# Patient Record
Sex: FEMALE | Race: WHITE | NOT HISPANIC OR LATINO | Employment: OTHER | URBAN - METROPOLITAN AREA
[De-identification: names, ages, dates, MRNs, and addresses within clinical notes are randomized per-mention and may not be internally consistent; named-entity substitution may affect disease eponyms.]

---

## 2018-01-08 NOTE — PATIENT DISCUSSION
Continue hot compress, AT prn, emycin lauren Qhs OU. Add Bactrim /160 mg   #14 Pills one pill twice a day.

## 2018-01-22 NOTE — PATIENT DISCUSSION
partially opened Tarsorrhaphy today without comps in the office, placed E mycin ointment in eye and patched eye. Patch can come off tomorrow. Continue all drops as instructed.

## 2018-02-14 NOTE — PATIENT DISCUSSION
Not much improvement. Continue Maxitrol QID OD. Stop erythromycin lauren and change to bacitracin lauren Qhs OU. Hot compresses QD.

## 2018-02-28 NOTE — PATIENT DISCUSSION
Starting to improve. Continue Maxitrol BID OD. Continue bacitracin lauren Qhs OU. Hot compresses QD.

## 2018-03-19 NOTE — PATIENT DISCUSSION
May consider small reduction in tarsorrhaphy with Pilar Sine if unable to get drops into OD, but do not recommend a full opening due to the risk of corneal decompensation.

## 2018-04-02 NOTE — PATIENT DISCUSSION
May consider small reduction in tarsorrhaphy with Careyyth Prude if unable to get drops into OD, but do not recommend a full opening due to the risk of corneal decompensation.

## 2018-04-30 ENCOUNTER — ESTABLISHED COMPREHENSIVE EXAM (OUTPATIENT)
Dept: URBAN - METROPOLITAN AREA CLINIC 36 | Facility: CLINIC | Age: 63
End: 2018-04-30

## 2018-04-30 DIAGNOSIS — H25.813: ICD-10-CM

## 2018-04-30 DIAGNOSIS — D31.31: ICD-10-CM

## 2018-04-30 DIAGNOSIS — H35.372: ICD-10-CM

## 2018-04-30 DIAGNOSIS — H04.123: ICD-10-CM

## 2018-04-30 PROCEDURE — 92015 DETERMINE REFRACTIVE STATE: CPT

## 2018-04-30 PROCEDURE — 92014 COMPRE OPH EXAM EST PT 1/>: CPT

## 2018-04-30 ASSESSMENT — VISUAL ACUITY
OS_SC: J6
OD_SC: 20/25-2
OD_SC: J3
OS_SC: 20/25-2

## 2018-04-30 ASSESSMENT — TONOMETRY
OD_IOP_MMHG: 11
OS_IOP_MMHG: 12

## 2019-06-05 ENCOUNTER — ESTABLISHED COMPREHENSIVE EXAM (OUTPATIENT)
Dept: URBAN - METROPOLITAN AREA CLINIC 36 | Facility: CLINIC | Age: 64
End: 2019-06-05

## 2019-06-05 DIAGNOSIS — H25.811: ICD-10-CM

## 2019-06-05 DIAGNOSIS — H25.812: ICD-10-CM

## 2019-06-05 DIAGNOSIS — H52.7: ICD-10-CM

## 2019-06-05 PROCEDURE — 92014 COMPRE OPH EXAM EST PT 1/>: CPT

## 2019-06-05 PROCEDURE — 92015 DETERMINE REFRACTIVE STATE: CPT

## 2019-06-05 ASSESSMENT — VISUAL ACUITY
OS_SC: J8
OD_SC: J6
OS_SC: 20/25-2
OD_SC: 20/25-2

## 2019-06-05 ASSESSMENT — TONOMETRY
OD_IOP_MMHG: 13
OS_IOP_MMHG: 14

## 2020-06-17 ENCOUNTER — ESTABLISHED COMPREHENSIVE EXAM (OUTPATIENT)
Dept: URBAN - METROPOLITAN AREA CLINIC 36 | Facility: CLINIC | Age: 65
End: 2020-06-17

## 2020-06-17 DIAGNOSIS — H25.811: ICD-10-CM

## 2020-06-17 DIAGNOSIS — H25.812: ICD-10-CM

## 2020-06-17 DIAGNOSIS — H52.7: ICD-10-CM

## 2020-06-17 PROCEDURE — 92015 DETERMINE REFRACTIVE STATE: CPT

## 2020-06-17 PROCEDURE — 92014 COMPRE OPH EXAM EST PT 1/>: CPT

## 2020-06-17 ASSESSMENT — VISUAL ACUITY
OD_SC: J3
OD_CC: J1+
OS_SC: J5
OD_SC: 20/25-2
OS_CC: J1+
OS_SC: 20/25-2

## 2020-06-17 ASSESSMENT — TONOMETRY
OS_IOP_MMHG: 14
OD_IOP_MMHG: 14

## 2021-07-06 ENCOUNTER — ESTABLISHED COMPREHENSIVE EXAM (OUTPATIENT)
Dept: URBAN - METROPOLITAN AREA CLINIC 36 | Facility: CLINIC | Age: 66
End: 2021-07-06

## 2021-07-06 DIAGNOSIS — H10.45: ICD-10-CM

## 2021-07-06 DIAGNOSIS — H35.372: ICD-10-CM

## 2021-07-06 DIAGNOSIS — D31.31: ICD-10-CM

## 2021-07-06 DIAGNOSIS — H52.7: ICD-10-CM

## 2021-07-06 DIAGNOSIS — H04.123: ICD-10-CM

## 2021-07-06 DIAGNOSIS — H25.811: ICD-10-CM

## 2021-07-06 DIAGNOSIS — H25.812: ICD-10-CM

## 2021-07-06 PROCEDURE — 92014 COMPRE OPH EXAM EST PT 1/>: CPT

## 2021-07-06 PROCEDURE — 92015 DETERMINE REFRACTIVE STATE: CPT

## 2021-07-06 ASSESSMENT — VISUAL ACUITY
OD_CC: J2
OS_CC: J1
OS_SC: 20/25
OD_SC: J3 @ 8"
OD_SC: 20/25-1

## 2021-07-06 ASSESSMENT — TONOMETRY
OD_IOP_MMHG: 16
OS_IOP_MMHG: 16

## 2022-08-30 ENCOUNTER — COMPREHENSIVE EXAM (OUTPATIENT)
Dept: URBAN - METROPOLITAN AREA CLINIC 36 | Facility: CLINIC | Age: 67
End: 2022-08-30

## 2022-08-30 DIAGNOSIS — H25.813: ICD-10-CM

## 2022-08-30 DIAGNOSIS — H04.123: ICD-10-CM

## 2022-08-30 DIAGNOSIS — H10.45: ICD-10-CM

## 2022-08-30 DIAGNOSIS — D31.31: ICD-10-CM

## 2022-08-30 DIAGNOSIS — H52.7: ICD-10-CM

## 2022-08-30 DIAGNOSIS — H35.372: ICD-10-CM

## 2022-08-30 PROCEDURE — 92014 COMPRE OPH EXAM EST PT 1/>: CPT

## 2022-08-30 PROCEDURE — 92015 DETERMINE REFRACTIVE STATE: CPT

## 2022-08-30 ASSESSMENT — VISUAL ACUITY
OD_CC: J1
OD_SC: 20/25-1
OD_SC: J6
OS_CC: J1+
OS_SC: J6
OS_SC: 20/30-2

## 2022-08-30 ASSESSMENT — TONOMETRY
OS_IOP_MMHG: 18
OD_IOP_MMHG: 19

## 2023-08-31 ENCOUNTER — COMPREHENSIVE EXAM (OUTPATIENT)
Dept: URBAN - METROPOLITAN AREA CLINIC 36 | Facility: CLINIC | Age: 68
End: 2023-08-31

## 2023-08-31 DIAGNOSIS — H35.372: ICD-10-CM

## 2023-08-31 DIAGNOSIS — H25.813: ICD-10-CM

## 2023-08-31 DIAGNOSIS — D31.31: ICD-10-CM

## 2023-08-31 DIAGNOSIS — H10.45: ICD-10-CM

## 2023-08-31 DIAGNOSIS — H04.123: ICD-10-CM

## 2023-08-31 PROCEDURE — 92015 DETERMINE REFRACTIVE STATE: CPT

## 2023-08-31 PROCEDURE — 92014 COMPRE OPH EXAM EST PT 1/>: CPT

## 2023-08-31 ASSESSMENT — TONOMETRY
OS_IOP_MMHG: 13
OD_IOP_MMHG: 14

## 2023-08-31 ASSESSMENT — VISUAL ACUITY
OS_SC: 20/30-1
OD_SC: J6
OS_SC: J8
OD_SC: 20/25

## 2024-09-03 ENCOUNTER — COMPREHENSIVE EXAM (OUTPATIENT)
Dept: URBAN - METROPOLITAN AREA CLINIC 36 | Facility: CLINIC | Age: 69
End: 2024-09-03

## 2024-09-03 DIAGNOSIS — D31.31: ICD-10-CM

## 2024-09-03 DIAGNOSIS — H25.813: ICD-10-CM

## 2024-09-03 DIAGNOSIS — H04.123: ICD-10-CM

## 2024-09-03 DIAGNOSIS — H52.7: ICD-10-CM

## 2024-09-03 DIAGNOSIS — H35.372: ICD-10-CM

## 2024-09-03 DIAGNOSIS — H10.45: ICD-10-CM

## 2024-09-03 PROCEDURE — 92014 COMPRE OPH EXAM EST PT 1/>: CPT

## 2024-09-03 PROCEDURE — 92015 DETERMINE REFRACTIVE STATE: CPT

## 2025-06-04 ENCOUNTER — COMPREHENSIVE EXAM (OUTPATIENT)
Age: 70
End: 2025-06-04

## 2025-06-04 DIAGNOSIS — D31.31: ICD-10-CM

## 2025-06-04 DIAGNOSIS — H52.7: ICD-10-CM

## 2025-06-04 DIAGNOSIS — H04.123: ICD-10-CM

## 2025-06-04 DIAGNOSIS — H35.372: ICD-10-CM

## 2025-06-04 DIAGNOSIS — H25.813: ICD-10-CM

## 2025-06-04 PROCEDURE — 92014 COMPRE OPH EXAM EST PT 1/>: CPT

## 2025-06-04 PROCEDURE — 92015 DETERMINE REFRACTIVE STATE: CPT

## 2025-08-04 ENCOUNTER — CONSULTATION/EVALUATION (OUTPATIENT)
Age: 70
End: 2025-08-04

## 2025-08-04 DIAGNOSIS — H52.7: ICD-10-CM

## 2025-08-04 DIAGNOSIS — H25.813: ICD-10-CM

## 2025-08-04 DIAGNOSIS — H35.373: ICD-10-CM

## 2025-08-04 DIAGNOSIS — H02.883: ICD-10-CM

## 2025-08-04 DIAGNOSIS — D31.31: ICD-10-CM

## 2025-08-04 DIAGNOSIS — H02.886: ICD-10-CM

## 2025-08-04 DIAGNOSIS — H10.45: ICD-10-CM

## 2025-08-04 DIAGNOSIS — H04.123: ICD-10-CM

## 2025-08-04 PROCEDURE — 92136 OPHTHALMIC BIOMETRY: CPT

## 2025-08-04 PROCEDURE — 92025 CPTRIZED CORNEAL TOPOGRAPHY: CPT

## 2025-08-04 PROCEDURE — 99214 OFFICE O/P EST MOD 30 MIN: CPT | Mod: 25

## 2025-08-04 PROCEDURE — 92134 CPTRZ OPH DX IMG PST SGM RTA: CPT

## 2025-08-04 RX ORDER — MOXIFLOXACIN OPHTHALMIC 5 MG/ML: 1 SOLUTION/ DROPS OPHTHALMIC

## 2025-08-04 RX ORDER — KETOROLAC TROMETHAMINE 5 MG/ML: 1 SOLUTION OPHTHALMIC

## 2025-08-04 RX ORDER — CYCLOSPORINE 0 MG/ML: 1 SOLUTION/ DROPS OPHTHALMIC; TOPICAL TWICE A DAY

## 2025-08-04 RX ORDER — PREDNISOLONE ACETATE 10 MG/ML: 1 SUSPENSION/ DROPS OPHTHALMIC

## 2025-08-13 ENCOUNTER — PRE-OP/H&P (OUTPATIENT)
Age: 70
End: 2025-08-13

## 2025-08-13 ENCOUNTER — SURGERY/PROCEDURE (OUTPATIENT)
Age: 70
End: 2025-08-13

## 2025-08-13 DIAGNOSIS — D31.31: ICD-10-CM

## 2025-08-13 DIAGNOSIS — H25.813: ICD-10-CM

## 2025-08-13 DIAGNOSIS — H02.883: ICD-10-CM

## 2025-08-13 DIAGNOSIS — H25.811: ICD-10-CM

## 2025-08-13 DIAGNOSIS — H35.373: ICD-10-CM

## 2025-08-13 DIAGNOSIS — H10.45: ICD-10-CM

## 2025-08-13 DIAGNOSIS — H04.123: ICD-10-CM

## 2025-08-13 DIAGNOSIS — H02.886: ICD-10-CM

## 2025-08-13 DIAGNOSIS — H52.7: ICD-10-CM

## 2025-08-13 PROCEDURE — 99211HP PRE-OP

## 2025-08-13 PROCEDURE — 66984 XCAPSL CTRC RMVL W/O ECP: CPT

## 2025-08-14 ENCOUNTER — POST-OP (OUTPATIENT)
Age: 70
End: 2025-08-14

## 2025-08-14 DIAGNOSIS — Z96.1: ICD-10-CM

## 2025-08-14 PROCEDURE — 99024 POSTOP FOLLOW-UP VISIT: CPT

## 2025-08-28 ENCOUNTER — SURGERY/PROCEDURE (OUTPATIENT)
Age: 70
End: 2025-08-28

## 2025-08-28 ENCOUNTER — PRE-OP/H&P (OUTPATIENT)
Age: 70
End: 2025-08-28

## 2025-08-28 DIAGNOSIS — H25.812: ICD-10-CM

## 2025-08-28 PROCEDURE — 66984 XCAPSL CTRC RMVL W/O ECP: CPT | Mod: 79,LT

## 2025-08-28 PROCEDURE — 99211HP PRE-OP

## 2025-08-29 ENCOUNTER — POST-OP (OUTPATIENT)
Age: 70
End: 2025-08-29

## 2025-08-29 DIAGNOSIS — Z96.1: ICD-10-CM

## 2025-08-29 PROCEDURE — 99024 POSTOP FOLLOW-UP VISIT: CPT
